# Patient Record
Sex: MALE | Race: WHITE | NOT HISPANIC OR LATINO | Employment: OTHER | URBAN - METROPOLITAN AREA
[De-identification: names, ages, dates, MRNs, and addresses within clinical notes are randomized per-mention and may not be internally consistent; named-entity substitution may affect disease eponyms.]

---

## 2017-01-13 ENCOUNTER — HOSPITAL ENCOUNTER (OUTPATIENT)
Dept: LAB | Facility: HOSPITAL | Age: 67
Discharge: HOME | End: 2017-01-13
Attending: INTERNAL MEDICINE | Admitting: INTERNAL MEDICINE

## 2017-01-13 LAB
INR PPP: 1.4 (ref 2–3)
PROTHROMBIN TIME: 18.3 SEC (ref 19.4–28.5)

## 2017-01-14 ENCOUNTER — HOSPITAL ENCOUNTER (OUTPATIENT)
Dept: LAB | Facility: HOSPITAL | Age: 67
Discharge: HOME | End: 2017-01-14
Attending: INTERNAL MEDICINE | Admitting: INTERNAL MEDICINE

## 2017-01-14 LAB
INR PPP: 1.3 (ref 2–3)
PROTHROMBIN TIME: 17.1 SEC (ref 19.4–28.5)

## 2017-01-15 ENCOUNTER — HOSPITAL ENCOUNTER (OUTPATIENT)
Dept: LAB | Facility: HOSPITAL | Age: 67
Discharge: HOME | End: 2017-01-15
Attending: INTERNAL MEDICINE | Admitting: INTERNAL MEDICINE

## 2017-01-15 LAB
INR PPP: 1.2
PROTHROMBIN TIME: 16.3 SEC (ref 9.6–11.7)

## 2017-01-16 ENCOUNTER — HOSPITAL ENCOUNTER (OUTPATIENT)
Dept: LAB | Facility: HOSPITAL | Age: 67
Setting detail: SPECIMEN
Discharge: HOME | End: 2017-01-16
Attending: INTERNAL MEDICINE | Admitting: INTERNAL MEDICINE

## 2017-01-16 LAB
INR PPP: 1.1 (ref 2–3)
PROTHROMBIN TIME: 14.6 SEC (ref 19.4–28.5)

## 2017-01-17 ENCOUNTER — HOSPITAL ENCOUNTER (OUTPATIENT)
Dept: LAB | Facility: HOSPITAL | Age: 67
Setting detail: SPECIMEN
Discharge: HOME | End: 2017-01-17
Attending: INTERNAL MEDICINE | Admitting: INTERNAL MEDICINE

## 2017-01-17 LAB
INR PPP: 1.2 (ref 2–3)
PROTHROMBIN TIME: 15.5 SEC (ref 19.4–28.5)

## 2017-01-18 ENCOUNTER — HOSPITAL ENCOUNTER (OUTPATIENT)
Dept: LAB | Facility: HOSPITAL | Age: 67
Setting detail: SPECIMEN
Discharge: HOME | End: 2017-01-18
Attending: INTERNAL MEDICINE | Admitting: INTERNAL MEDICINE

## 2017-01-18 LAB
INR PPP: 1.5 (ref 2–3)
PROTHROMBIN TIME: 19.6 SEC (ref 19.4–28.5)

## 2019-09-06 ENCOUNTER — TELEPHONE (OUTPATIENT)
Dept: CARDIOLOGY | Facility: CLINIC | Age: 69
End: 2019-09-06

## 2019-09-06 RX ORDER — LISINOPRIL 2.5 MG/1
2.5 TABLET ORAL DAILY
Qty: 30 TABLET | Refills: 0 | Status: SHIPPED | OUTPATIENT
Start: 2019-09-06 | End: 2019-09-30 | Stop reason: SDUPTHER

## 2019-09-29 PROBLEM — I48.0 PAROXYSMAL ATRIAL FIBRILLATION (HCC): Status: ACTIVE | Noted: 2019-09-29

## 2019-09-29 PROBLEM — Z86.79 HISTORY OF ATRIAL FLUTTER: Status: ACTIVE | Noted: 2019-09-29

## 2019-09-29 PROBLEM — Z98.890 HISTORY OF REPAIR OF MITRAL VALVE: Status: ACTIVE | Noted: 2019-09-29

## 2019-09-29 PROBLEM — Z98.890 HISTORY OF TRICUSPID VALVE REPAIR: Status: ACTIVE | Noted: 2019-09-29

## 2019-09-29 PROBLEM — Z95.1 S/P CABG (CORONARY ARTERY BYPASS GRAFT): Status: ACTIVE | Noted: 2019-09-29

## 2019-09-30 RX ORDER — LISINOPRIL 2.5 MG/1
2.5 TABLET ORAL DAILY
Qty: 30 TABLET | Refills: 0 | Status: SHIPPED | OUTPATIENT
Start: 2019-09-30 | End: 2019-10-31 | Stop reason: SDUPTHER

## 2019-10-19 PROBLEM — I51.9 LV DYSFUNCTION: Status: ACTIVE | Noted: 2019-10-19

## 2019-10-21 ENCOUNTER — OFFICE VISIT (OUTPATIENT)
Dept: CARDIOLOGY | Facility: CLINIC | Age: 69
End: 2019-10-21

## 2019-10-21 VITALS
BODY MASS INDEX: 25.8 KG/M2 | DIASTOLIC BLOOD PRESSURE: 80 MMHG | WEIGHT: 201 LBS | SYSTOLIC BLOOD PRESSURE: 118 MMHG | HEIGHT: 74 IN | HEART RATE: 81 BPM

## 2019-10-21 DIAGNOSIS — Z98.890 HISTORY OF REPAIR OF MITRAL VALVE: ICD-10-CM

## 2019-10-21 DIAGNOSIS — R53.83 OTHER FATIGUE: ICD-10-CM

## 2019-10-21 DIAGNOSIS — E78.2 MIXED HYPERLIPIDEMIA: ICD-10-CM

## 2019-10-21 DIAGNOSIS — I48.21 PERMANENT ATRIAL FIBRILLATION (HCC): Primary | ICD-10-CM

## 2019-10-21 DIAGNOSIS — Z98.890 HISTORY OF TRICUSPID VALVE REPAIR: ICD-10-CM

## 2019-10-21 DIAGNOSIS — I25.10 CORONARY ARTERY DISEASE INVOLVING NATIVE CORONARY ARTERY OF NATIVE HEART WITHOUT ANGINA PECTORIS: ICD-10-CM

## 2019-10-21 DIAGNOSIS — R06.02 SHORTNESS OF BREATH: ICD-10-CM

## 2019-10-21 DIAGNOSIS — I51.9 LV DYSFUNCTION: ICD-10-CM

## 2019-10-21 DIAGNOSIS — R91.8 LUNG NODULES: ICD-10-CM

## 2019-10-21 DIAGNOSIS — I10 ESSENTIAL HYPERTENSION: ICD-10-CM

## 2019-10-21 PROBLEM — J43.8 OTHER EMPHYSEMA (HCC): Status: ACTIVE | Noted: 2019-10-21

## 2019-10-21 PROCEDURE — 93000 ELECTROCARDIOGRAM COMPLETE: CPT | Performed by: INTERNAL MEDICINE

## 2019-10-21 PROCEDURE — 99214 OFFICE O/P EST MOD 30 MIN: CPT | Performed by: INTERNAL MEDICINE

## 2019-10-21 RX ORDER — POTASSIUM CHLORIDE 750 MG/1
10 TABLET, EXTENDED RELEASE ORAL DAILY
Status: SHIPPED | COMMUNITY
Start: 2019-10-21

## 2019-10-21 NOTE — PROGRESS NOTES
Subjective:     Encounter Date:10/21/2019      Patient ID: Kaylene Blake is a 69 y.o. male.    Chief Complaint:  Chief Complaint   Patient presents with   • Atrial Fibrillation   • Congestive Heart Failure       HPI:  History of Present Illness  I had the pleasure of seeing Mr. Blake in the office today.  He is a 69-year-old gentleman with known coronary disease, mitral, diastolic dysfunction, left ventricular dysfunction, permanent atrial fibrillation, emphysema and lung nodules.  Presents to the office today with new complaints of increasing fatigue and ongoing dyspnea.     underwent catheterization in December 2018. His left main was normal.  The LAD was occluded.  Circumflex had 90% stenosis.  OM 2 was occluded.  The RCA had 80% stenosis.  The LIMA to the LAD was patent.  The SVG to mid marginal was patent.  SVG RCA was patent.  His wedge pressure was 18 PA pressure was 44/22.  He had an echocardiogram in October 2018 which demonstrated an ejection fraction 45 to 50% with dilated right ventricle, aortic sclerosis, mild mitral and tricuspid insufficiency.    Mr. Blake states that he is fatigued when he begins to exert himself.  He then becomes short of air and has to stop activity.  He states he rests for a few minutes and then he feels okay.  He does not have chest discomfort.  He does not notice any significant lower extremity edema.  He denies orthopnea or paroxysmal nocturnal dyspnea.  He does not notice palpitations or rapid heart rate.  He had a sleep study within the last year which was negative.  He did have a CT scan in October 2018 which was negative for pulmonary embolus.  Emphysema was noted.  Small noncalcified pulmonary nodules in the lung apices were noted and it was recommended that he have a precautionary 12-month follow-up CT.  This is not been performed as yet.    The following portions of the patient's history were reviewed and updated as appropriate: allergies, current  medications, past family history, past medical history, past social history, past surgical history and problem list.    Problem List:  Patient Active Problem List   Diagnosis   • Hypertension   • Hyperlipidemia   • Coronary artery disease   • Paroxysmal atrial fibrillation (CMS/[facility])   • History of repair of mitral valve   • S/P CABG (coronary artery bypass graft)   • History of tricuspid valve repair   • History of atrial flutter   • LV dysfunction   • Permanent atrial fibrillation   • Other emphysema (CMS/[facility])   • Lung nodules       Past Medical History:  Past Medical History:   Diagnosis Date   • Atrial fibrillation (CMS/[facility])    • CHF (congestive heart failure) (CMS/[facility])    • Coronary artery disease     1-10-15 CABG: LIMA to LAD; saphenous vein graft to mid marginal; saphenous vein graft to RCA   • Coronary artery disease     December 2018: Left main normal. LAD with ostial 100% occlusion. Circumflex with 90% ostial stenosis. OM 2 totally occluded. RCA was dominant with a distal long 80% stenosis.   • Hyperlipidemia    • Hypertension    • Valvular disease     h/o of Mitral valve repair and Tricuspid valve repair        Past Surgical History:  Past Surgical History:   Procedure Laterality Date   • CARDIAC CATHETERIZATION      December 2018: Left main normal. LAD with ostial 100% occlusion. Circumflex with 90% ostial stenosis. OM 2 totally occluded. RCA was dominant with a distal long 80% stenosis.   • CORONARY ARTERY BYPASS GRAFT      LIMA to LAD; saphenous vein graft to mid marginal; saphenous vein graft to RCA   • MITRAL VALVE REPAIR/REPLACEMENT      January 2015: Mitral valve repair with #28 mm [employer] ring.    • TRICUSPID VALVE SURGERY      January 2015: Tricuspid valve repair using a remodeling annuloplasty #30 mm [employer] ring.       Social History:  Social History     Socioeconomic History   • Marital status:      Spouse name: Not on file   • Number of children: Not on file    • Years of education: Not on file   • Highest education level: Not on file   Tobacco Use   • Smoking status: Former Smoker   Substance and Sexual Activity   • Alcohol use: Yes   • Drug use: No   • Sexual activity: Defer       Allergies:  No Known Allergies    Immunizations:    There is no immunization history on file for this patient.    ROS:  Review of Systems   Constitution: Positive for malaise/fatigue. Negative for chills, decreased appetite, fever, weight gain and weight loss.   HENT: Negative for congestion, hoarse voice, nosebleeds and sore throat.    Eyes: Negative for blurred vision, double vision and visual disturbance.   Cardiovascular: Positive for dyspnea on exertion. Negative for chest pain, claudication, irregular heartbeat, leg swelling, near-syncope, orthopnea, palpitations, paroxysmal nocturnal dyspnea and syncope.   Respiratory: Positive for shortness of breath. Negative for cough, hemoptysis, sleep disturbances due to breathing, snoring, sputum production and wheezing.    Endocrine: Negative for cold intolerance, heat intolerance, polydipsia and polyuria.   Hematologic/Lymphatic: Negative for adenopathy and bleeding problem. Does not bruise/bleed easily.   Skin: Negative for flushing, itching, nail changes and rash.   Musculoskeletal: Positive for arthritis. Negative for back pain, joint pain, muscle cramps, muscle weakness, myalgias and neck pain.   Gastrointestinal: Negative for bloating, abdominal pain, anorexia, change in bowel habit, constipation, diarrhea, heartburn, hematemesis, hematochezia, jaundice, melena, nausea and vomiting.   Genitourinary: Negative for dysuria, hematuria and nocturia.   Neurological: Negative for brief paralysis, disturbances in coordination, excessive daytime sleepiness, dizziness, headaches, light-headedness, loss of balance, numbness, paresthesias, seizures and vertigo.   Psychiatric/Behavioral: Negative for altered mental status and depression. The patient is  "not nervous/anxious.    Allergic/Immunologic: Negative for environmental allergies and hives.          Objective:         /80   Pulse 81   Ht 188 cm (74\")   Wt 91.2 kg (201 lb)   BMI 25.81 kg/m²     Physical Exam   Constitutional: He is oriented to person, place, and time. He appears well-developed and well-nourished. No distress.   HENT:   Head: Normocephalic and atraumatic.   Mouth/Throat: Oropharynx is clear and moist.   Eyes: Conjunctivae and EOM are normal. Pupils are equal, round, and reactive to light. No scleral icterus.   Neck: Normal range of motion. Neck supple. No thyromegaly present.   Cardiovascular: Normal rate, S1 normal, S2 normal and intact distal pulses. An irregularly irregular rhythm present.  No extrasystoles are present. PMI is not displaced. Exam reveals no gallop, no S3, no S4, no friction rub and no decreased pulses.   Murmur ( There is a 1/6 to 2/6 systolic murmur heard at the left lower sternal border) heard.  Pulses:       Carotid pulses are 2+ on the right side, and 2+ on the left side.       Dorsalis pedis pulses are 2+ on the right side, and 2+ on the left side.        Posterior tibial pulses are 2+ on the right side, and 2+ on the left side.   Pulmonary/Chest: Effort normal and breath sounds normal. No respiratory distress. He has no wheezes. He has no rales.   Abdominal: Soft. Bowel sounds are normal. He exhibits no distension and no mass. There is no tenderness. There is no rebound and no guarding.   Musculoskeletal: Normal range of motion. Edema:  There is trace bilateral lower extremity edema.   Lymphadenopathy:     He has no cervical adenopathy.   Neurological: He is alert and oriented to person, place, and time. Coordination normal.   Skin: Skin is warm and dry. No rash noted. He is not diaphoretic. No pallor.   Psychiatric: He has a normal mood and affect. His behavior is normal.   Nursing note and vitals reviewed.      In-Office Procedure(s):    ECG 12 " Lead  Date/Time: 10/21/2019 10:43 AM  Performed by: Kerry Sommers MD  Authorized by: Kerry Sommers MD   Comparison: compared with previous ECG from 9/21/2018  Similar to previous ECG  Comments: Atrial fibrillation, rate 70.  Nonspecific interventricular conduction delay.  Poor anterior R wave progression.  Lateral T wave changes noted.            ASCVD RIsk Score::  The ASCVD Risk score ([place] YVONNE Jr., et al., 2013) failed to calculate for the following reasons:    Cannot find a previous HDL lab    Cannot find a previous total cholesterol lab    Recent Radiology:  Imaging Results (most recent)     None          Lab Review:   not applicable             Assessment:          Diagnosis Plan   1. Shortness of breath  Adult Transthoracic Echo Complete W/ Cont if Necessary Per Protocol   2. Other fatigue     3. Permanent atrial fibrillation  Holter Monitor - 72 Hour Up To 21 Days   4. Lung nodules  CT Chest Without Contrast   5. History of repair of mitral valve     6. History of tricuspid valve repair     7. Coronary artery disease involving native coronary artery of native heart without angina pectoris     8. Mixed hyperlipidemia     9. Essential hypertension     10. LV dysfunction            Plan:      1.  Shortness of air  Mr. Blake is complaining of shortness of air.  I am going to order an echocardiogram to make sure that his left ventricular ejection fraction has remained stable.  He did have a dilated right ventricle which is most likely a reflection of his underlying pulmonary disease.  If his RVSP remains elevated, will refer him to pulmonary.    2.  Fatigue  This is most likely multifactorial.  I am going to place a 72-hour monitor to see if his atrial fibrillation rate is controlled    3.  Permanent atrial fibrillation  Mr. Blake has been in atrial fibrillation for quite some time.  He was cardioverted in 2017 but returned to atrial fibrillation.  He is now on anticoagulation with rate control.    4.   Lung nodules  Lung nodules were noted on the previous CT scan in October 2018.  A follow-up CT scan was recommended.  I will order this    5.  Mitral and tricuspid valve repair  Stable.  Previous echo October 2018 demonstrated mild MR and TR    6.  CAD  Prior CABG.  Catheterization in December 2018 demonstrated patent grafts.  His EKG in the office today demonstrated atrial fibrillation with lateral T wave changes.  This is unchanged from previous tracing    7.  Dyslipidemia  Mr. Blake states that he had lab work obtained approximately 1 week ago.  I will get a copy    8.  Essential hypertension  Blood pressure is currently well controlled    9.  LV dysfunction  His ejection fraction is 45 to 50% per echo in October 2018        Level of Care:                 Kerry Sommers MD  10/21/19  .

## 2019-10-23 ENCOUNTER — TELEPHONE (OUTPATIENT)
Dept: CARDIOLOGY | Facility: CLINIC | Age: 69
End: 2019-10-23

## 2019-10-23 NOTE — TELEPHONE ENCOUNTER
Mr Blake is going to [facility]. He is needing a prescription that states he needs Eliquis (not the generic), [facility]. Requesting a call back. Thank you      [phone]

## 2019-10-31 RX ORDER — LISINOPRIL 2.5 MG/1
2.5 TABLET ORAL DAILY
Qty: 30 TABLET | Refills: 5 | Status: SHIPPED | OUTPATIENT
Start: 2019-10-31 | End: 2020-01-02 | Stop reason: SDUPTHER

## 2019-12-26 ENCOUNTER — TELEPHONE (OUTPATIENT)
Dept: CARDIOLOGY | Facility: CLINIC | Age: 69
End: 2019-12-26

## 2019-12-28 ENCOUNTER — HOSPITAL ENCOUNTER (EMERGENCY)
Facility: HOSPITAL | Age: 69
Discharge: HOME | End: 2019-12-28
Attending: EMERGENCY MEDICINE | Admitting: EMERGENCY MEDICINE

## 2019-12-28 ENCOUNTER — APPOINTMENT (OUTPATIENT)
Dept: MRI IMAGING | Facility: HOSPITAL | Age: 69
End: 2019-12-28

## 2019-12-28 ENCOUNTER — APPOINTMENT (OUTPATIENT)
Dept: GENERAL RADIOLOGY | Facility: HOSPITAL | Age: 69
End: 2019-12-28

## 2019-12-28 VITALS
WEIGHT: 199.3 LBS | SYSTOLIC BLOOD PRESSURE: 134 MMHG | BODY MASS INDEX: 25.58 KG/M2 | TEMPERATURE: 97.8 F | RESPIRATION RATE: 16 BRPM | OXYGEN SATURATION: 95 % | HEART RATE: 75 BPM | HEIGHT: 74 IN | DIASTOLIC BLOOD PRESSURE: 65 MMHG

## 2019-12-28 DIAGNOSIS — R20.2 PARESTHESIAS: Primary | ICD-10-CM

## 2019-12-28 DIAGNOSIS — R60.0 LEG EDEMA: ICD-10-CM

## 2019-12-28 LAB
ALBUMIN SERPL-MCNC: 4.4 G/DL (ref 3.5–5.2)
ALBUMIN/GLOB SERPL: 1.2 G/DL
ALP SERPL-CCNC: 96 U/L (ref 39–117)
ALT SERPL W P-5'-P-CCNC: 21 U/L (ref 1–41)
ANION GAP SERPL CALCULATED.3IONS-SCNC: 13 MMOL/L (ref 5–15)
AST SERPL-CCNC: 31 U/L (ref 1–40)
BASOPHILS # BLD AUTO: 0 10*3/MM3 (ref 0–0.2)
BASOPHILS NFR BLD AUTO: 0 % (ref 0–1.5)
BILIRUB SERPL-MCNC: 1.3 MG/DL (ref 0.2–1.2)
BUN BLD-MCNC: 15 MG/DL (ref 8–23)
BUN/CREAT SERPL: 17.9 (ref 7–25)
CALCIUM SPEC-SCNC: 9.3 MG/DL (ref 8.6–10.5)
CHLORIDE SERPL-SCNC: 99 MMOL/L (ref 98–107)
CO2 SERPL-SCNC: 26 MMOL/L (ref 22–29)
CREAT BLD-MCNC: 0.84 MG/DL (ref 0.76–1.27)
DEPRECATED RDW RBC AUTO: 49.4 FL (ref 37–54)
EOSINOPHIL # BLD AUTO: 0.2 10*3/MM3 (ref 0–0.4)
EOSINOPHIL NFR BLD AUTO: 3 % (ref 0.3–6.2)
ERYTHROCYTE [DISTWIDTH] IN BLOOD BY AUTOMATED COUNT: 15.8 % (ref 12.3–15.4)
GFR SERPL CREATININE-BSD FRML MDRD: 90 ML/MIN/1.73
GLOBULIN UR ELPH-MCNC: 3.6 GM/DL
GLUCOSE BLD-MCNC: 105 MG/DL (ref 65–99)
HCT VFR BLD AUTO: 43.7 % (ref 37.5–51)
HGB BLD-MCNC: 14.6 G/DL (ref 13–17.7)
LYMPHOCYTES # BLD AUTO: 1.1 10*3/MM3 (ref 0.7–3.1)
LYMPHOCYTES NFR BLD AUTO: 20.9 % (ref 19.6–45.3)
MAGNESIUM SERPL-MCNC: 2.3 MG/DL (ref 1.6–2.4)
MCH RBC QN AUTO: 30.1 PG (ref 26.6–33)
MCHC RBC AUTO-ENTMCNC: 33.5 G/DL (ref 31.5–35.7)
MCV RBC AUTO: 90 FL (ref 79–97)
MONOCYTES # BLD AUTO: 0.9 10*3/MM3 (ref 0.1–0.9)
MONOCYTES NFR BLD AUTO: 17.5 % (ref 5–12)
NEUTROPHILS # BLD AUTO: 3.1 10*3/MM3 (ref 1.7–7)
NEUTROPHILS NFR BLD AUTO: 58.6 % (ref 42.7–76)
NRBC BLD AUTO-RTO: 0 /100 WBC (ref 0–0.2)
NT-PROBNP SERPL-MCNC: 778 PG/ML (ref 5–900)
PLATELET # BLD AUTO: 267 10*3/MM3 (ref 140–450)
PMV BLD AUTO: 7.6 FL (ref 6–12)
POTASSIUM BLD-SCNC: 3.6 MMOL/L (ref 3.5–5.2)
PROT SERPL-MCNC: 8 G/DL (ref 6–8.5)
RBC # BLD AUTO: 4.85 10*6/MM3 (ref 4.14–5.8)
SODIUM BLD-SCNC: 138 MMOL/L (ref 136–145)
TROPONIN T SERPL-MCNC: <0.01 NG/ML (ref 0–0.03)
WBC NRBC COR # BLD: 5.4 10*3/MM3 (ref 3.4–10.8)

## 2019-12-28 PROCEDURE — 83735 ASSAY OF MAGNESIUM: CPT | Performed by: EMERGENCY MEDICINE

## 2019-12-28 PROCEDURE — 83880 ASSAY OF NATRIURETIC PEPTIDE: CPT | Performed by: EMERGENCY MEDICINE

## 2019-12-28 PROCEDURE — 70551 MRI BRAIN STEM W/O DYE: CPT

## 2019-12-28 PROCEDURE — 71045 X-RAY EXAM CHEST 1 VIEW: CPT

## 2019-12-28 PROCEDURE — 85025 COMPLETE CBC W/AUTO DIFF WBC: CPT | Performed by: EMERGENCY MEDICINE

## 2019-12-28 PROCEDURE — 84484 ASSAY OF TROPONIN QUANT: CPT | Performed by: EMERGENCY MEDICINE

## 2019-12-28 PROCEDURE — 80053 COMPREHEN METABOLIC PANEL: CPT | Performed by: EMERGENCY MEDICINE

## 2019-12-28 PROCEDURE — 93005 ELECTROCARDIOGRAM TRACING: CPT | Performed by: EMERGENCY MEDICINE

## 2019-12-28 PROCEDURE — 99284 EMERGENCY DEPT VISIT MOD MDM: CPT

## 2019-12-28 RX ORDER — SODIUM CHLORIDE 0.9 % (FLUSH) 0.9 %
10 SYRINGE (ML) INJECTION AS NEEDED
Status: DISCONTINUED | OUTPATIENT
Start: 2019-12-28 | End: 2019-12-28 | Stop reason: HOSPADM

## 2019-12-30 ENCOUNTER — TELEPHONE (OUTPATIENT)
Dept: CARDIOLOGY | Facility: CLINIC | Age: 69
End: 2019-12-30

## 2019-12-30 DIAGNOSIS — R06.02 SHORTNESS OF BREATH: ICD-10-CM

## 2019-12-30 DIAGNOSIS — R60.0 BILATERAL LEG EDEMA: ICD-10-CM

## 2019-12-30 DIAGNOSIS — I48.0 PAROXYSMAL ATRIAL FIBRILLATION (HCC): Primary | ICD-10-CM

## 2019-12-30 NOTE — TELEPHONE ENCOUNTER
Mr Wolf was having swelling in his feet and pain in left arm. He went to the [facility] 12/28/19 and they ran a bunch of test. They recommend he have a echocardiogram done. Patient would like to know if one could be schedule or does he need to come in sooner for a office visit. Thank you    [phone]

## 2019-12-31 NOTE — TELEPHONE ENCOUNTER
Patients wife calling regarding the message below. Wants to see if we can get him in for an appt on Jan 2.

## 2019-12-31 NOTE — TELEPHONE ENCOUNTER
Mr Shelton Called to see if Adrienne had heard anything from Dr Butler. Told patient Adrienne was waiting on a response, would call as soon as she heard something. mg

## 2020-01-02 ENCOUNTER — HOSPITAL ENCOUNTER (OUTPATIENT)
Dept: CARDIOLOGY | Facility: HOSPITAL | Age: 70
Discharge: HOME | End: 2020-01-02
Admitting: INTERNAL MEDICINE

## 2020-01-02 VITALS
SYSTOLIC BLOOD PRESSURE: 115 MMHG | WEIGHT: 190 LBS | BODY MASS INDEX: 24.38 KG/M2 | DIASTOLIC BLOOD PRESSURE: 73 MMHG | HEIGHT: 74 IN

## 2020-01-02 DIAGNOSIS — I51.7 RIGHT VENTRICULAR ENLARGEMENT: Primary | ICD-10-CM

## 2020-01-02 DIAGNOSIS — I48.0 PAROXYSMAL ATRIAL FIBRILLATION (HCC): ICD-10-CM

## 2020-01-02 DIAGNOSIS — R06.02 SHORTNESS OF BREATH: ICD-10-CM

## 2020-01-02 DIAGNOSIS — R60.0 BILATERAL LEG EDEMA: ICD-10-CM

## 2020-01-02 DIAGNOSIS — I51.7 RIGHT VENTRICULAR DILATION: ICD-10-CM

## 2020-01-02 DIAGNOSIS — I50.810 RIGHT-SIDED CONGESTIVE HEART FAILURE, UNSPECIFIED HF CHRONICITY (HCC): Primary | ICD-10-CM

## 2020-01-02 LAB
BH CV ECHO MEAS - ACS: 2.2 CM
BH CV ECHO MEAS - AO MAX PG (FULL): 4.2 MMHG
BH CV ECHO MEAS - AO MAX PG: 6.5 MMHG
BH CV ECHO MEAS - AO MEAN PG (FULL): 2.3 MMHG
BH CV ECHO MEAS - AO MEAN PG: 3.6 MMHG
BH CV ECHO MEAS - AO ROOT AREA (BSA CORRECTED): 1.4
BH CV ECHO MEAS - AO ROOT AREA: 7.2 CM^2
BH CV ECHO MEAS - AO ROOT DIAM: 3 CM
BH CV ECHO MEAS - AO V2 MAX: 127 CM/SEC
BH CV ECHO MEAS - AO V2 MEAN: 90.7 CM/SEC
BH CV ECHO MEAS - AO V2 VTI: 28 CM
BH CV ECHO MEAS - AORTIC HR: 193.8 BPM
BH CV ECHO MEAS - AORTIC R-R: 0.31 SEC
BH CV ECHO MEAS - ASC AORTA: 3.8 CM
BH CV ECHO MEAS - AVA(I,A): 2.3 CM^2
BH CV ECHO MEAS - AVA(I,D): 2.3 CM^2
BH CV ECHO MEAS - AVA(V,A): 2.6 CM^2
BH CV ECHO MEAS - AVA(V,D): 2.6 CM^2
BH CV ECHO MEAS - BSA(HAYCOCK): 2.1 M^2
BH CV ECHO MEAS - BSA: 2.1 M^2
BH CV ECHO MEAS - BZI_BMI: 24.4 KILOGRAMS/M^2
BH CV ECHO MEAS - BZI_METRIC_HEIGHT: 188 CM
BH CV ECHO MEAS - BZI_METRIC_WEIGHT: 86.2 KG
BH CV ECHO MEAS - CI(AO): 18.5 L/MIN/M^2
BH CV ECHO MEAS - CI(LVOT): 6 L/MIN/M^2
BH CV ECHO MEAS - CO(AO): 39.3 L/MIN
BH CV ECHO MEAS - CO(LVOT): 12.7 L/MIN
BH CV ECHO MEAS - EDV(CUBED): 104.8 ML
BH CV ECHO MEAS - EDV(MOD-SP2): 84.3 ML
BH CV ECHO MEAS - EDV(MOD-SP4): 95.5 ML
BH CV ECHO MEAS - EDV(TEICH): 103.1 ML
BH CV ECHO MEAS - EF(CUBED): 54.8 %
BH CV ECHO MEAS - EF(MOD-BP): 39 %
BH CV ECHO MEAS - EF(MOD-SP2): 43.7 %
BH CV ECHO MEAS - EF(MOD-SP4): 22.2 %
BH CV ECHO MEAS - EF(TEICH): 46.6 %
BH CV ECHO MEAS - ESV(CUBED): 47.4 ML
BH CV ECHO MEAS - ESV(MOD-SP2): 47.4 ML
BH CV ECHO MEAS - ESV(MOD-SP4): 74.3 ML
BH CV ECHO MEAS - ESV(TEICH): 55.1 ML
BH CV ECHO MEAS - FS: 23.3 %
BH CV ECHO MEAS - IVS/LVPW: 1.1
BH CV ECHO MEAS - IVSD: 1.1 CM
BH CV ECHO MEAS - LA DIMENSION(2D): 4.6 CM
BH CV ECHO MEAS - LV DIASTOLIC VOL/BSA (35-75): 44.9 ML/M^2
BH CV ECHO MEAS - LV MASS(C)D: 191 GRAMS
BH CV ECHO MEAS - LV MASS(C)DI: 89.8 GRAMS/M^2
BH CV ECHO MEAS - LV MAX PG: 2.2 MMHG
BH CV ECHO MEAS - LV MEAN PG: 1.3 MMHG
BH CV ECHO MEAS - LV SYSTOLIC VOL/BSA (12-30): 34.9 ML/M^2
BH CV ECHO MEAS - LV V1 MAX: 74.9 CM/SEC
BH CV ECHO MEAS - LV V1 MEAN: 53.8 CM/SEC
BH CV ECHO MEAS - LV V1 VTI: 15.2 CM
BH CV ECHO MEAS - LVIDD: 4.7 CM
BH CV ECHO MEAS - LVIDS: 3.6 CM
BH CV ECHO MEAS - LVOT AREA: 4.3 CM^2
BH CV ECHO MEAS - LVOT DIAM: 2.3 CM
BH CV ECHO MEAS - LVPWD: 1.1 CM
BH CV ECHO MEAS - MV DEC TIME: 0.27 SEC
BH CV ECHO MEAS - MV E MAX VEL: 159.4 CM/SEC
BH CV ECHO MEAS - PA ACC TIME: 0.04 SEC
BH CV ECHO MEAS - PA MAX PG: 4.9 MMHG
BH CV ECHO MEAS - PA PR(ACCEL): 60.2 MMHG
BH CV ECHO MEAS - PA V2 MAX: 110.7 CM/SEC
BH CV ECHO MEAS - PI END-D VEL: 206.8 CM/SEC
BH CV ECHO MEAS - PI MAX PG: 36.2 MMHG
BH CV ECHO MEAS - PI MAX VEL: 300.5 CM/SEC
BH CV ECHO MEAS - RAP SYSTOLE: 8 MMHG
BH CV ECHO MEAS - RVDD: 4 CM
BH CV ECHO MEAS - RVOT AREA: 7 CM^2
BH CV ECHO MEAS - RVOT DIAM: 3 CM
BH CV ECHO MEAS - RVSP: 50.8 MMHG
BH CV ECHO MEAS - SI(AO): 95.2 ML/M^2
BH CV ECHO MEAS - SI(CUBED): 27 ML/M^2
BH CV ECHO MEAS - SI(LVOT): 30.8 ML/M^2
BH CV ECHO MEAS - SI(MOD-SP2): 17.3 ML/M^2
BH CV ECHO MEAS - SI(MOD-SP4): 10 ML/M^2
BH CV ECHO MEAS - SI(TEICH): 22.6 ML/M^2
BH CV ECHO MEAS - SV(AO): 202.5 ML
BH CV ECHO MEAS - SV(CUBED): 57.4 ML
BH CV ECHO MEAS - SV(LVOT): 65.6 ML
BH CV ECHO MEAS - SV(MOD-SP2): 36.8 ML
BH CV ECHO MEAS - SV(MOD-SP4): 21.2 ML
BH CV ECHO MEAS - SV(TEICH): 48 ML
BH CV ECHO MEAS - TR MAX VEL: 326 CM/SEC

## 2020-01-02 PROCEDURE — 93306 TTE W/DOPPLER COMPLETE: CPT

## 2020-01-02 PROCEDURE — 93306 TTE W/DOPPLER COMPLETE: CPT | Performed by: INTERNAL MEDICINE

## 2020-01-02 RX ORDER — LISINOPRIL 5 MG/1
5 TABLET ORAL DAILY
Qty: 90 TABLET | Refills: 1 | Status: SHIPPED | OUTPATIENT
Start: 2020-01-02

## 2020-01-07 ENCOUNTER — HOSPITAL ENCOUNTER (OUTPATIENT)
Dept: CT IMAGING | Facility: HOSPITAL | Age: 70
Discharge: HOME | End: 2020-01-07
Admitting: INTERNAL MEDICINE

## 2020-01-07 DIAGNOSIS — I51.7 RIGHT VENTRICULAR DILATION: ICD-10-CM

## 2020-01-07 DIAGNOSIS — R06.02 SHORTNESS OF BREATH: ICD-10-CM

## 2020-01-07 DIAGNOSIS — I50.810 RIGHT-SIDED CONGESTIVE HEART FAILURE, UNSPECIFIED HF CHRONICITY (HCC): ICD-10-CM

## 2020-01-07 PROCEDURE — 0 IOPAMIDOL PER 1 ML: Performed by: INTERNAL MEDICINE

## 2020-01-07 PROCEDURE — 71275 CT ANGIOGRAPHY CHEST: CPT

## 2020-01-07 RX ADMIN — IOPAMIDOL 100 ML: 755 INJECTION, SOLUTION INTRAVENOUS at 13:48

## 2020-01-09 ENCOUNTER — TELEPHONE (OUTPATIENT)
Dept: CARDIOLOGY | Facility: CLINIC | Age: 70
End: 2020-01-09

## 2020-01-22 ENCOUNTER — TELEPHONE (OUTPATIENT)
Dept: CARDIOLOGY | Facility: CLINIC | Age: 70
End: 2020-01-22

## 2020-01-22 ENCOUNTER — OFFICE VISIT (OUTPATIENT)
Dept: CARDIOLOGY | Facility: CLINIC | Age: 70
End: 2020-01-22

## 2020-01-22 VITALS
WEIGHT: 209 LBS | BODY MASS INDEX: 26.82 KG/M2 | HEART RATE: 78 BPM | SYSTOLIC BLOOD PRESSURE: 102 MMHG | HEIGHT: 74 IN | DIASTOLIC BLOOD PRESSURE: 72 MMHG

## 2020-01-22 DIAGNOSIS — I48.21 PERMANENT ATRIAL FIBRILLATION (HCC): ICD-10-CM

## 2020-01-22 DIAGNOSIS — I10 ESSENTIAL HYPERTENSION: ICD-10-CM

## 2020-01-22 DIAGNOSIS — Z98.890 HISTORY OF REPAIR OF MITRAL VALVE: ICD-10-CM

## 2020-01-22 DIAGNOSIS — Z98.890 HISTORY OF TRICUSPID VALVE REPAIR: ICD-10-CM

## 2020-01-22 DIAGNOSIS — R06.09 DYSPNEA ON EXERTION: ICD-10-CM

## 2020-01-22 DIAGNOSIS — I51.9 LV DYSFUNCTION: ICD-10-CM

## 2020-01-22 DIAGNOSIS — I25.10 CORONARY ARTERY DISEASE INVOLVING NATIVE CORONARY ARTERY OF NATIVE HEART WITHOUT ANGINA PECTORIS: Primary | ICD-10-CM

## 2020-01-22 PROCEDURE — 99213 OFFICE O/P EST LOW 20 MIN: CPT | Performed by: INTERNAL MEDICINE

## 2020-01-22 NOTE — PROGRESS NOTES
Subjective:     Encounter Date:01/22/2020      Patient ID: Wolf Blake is a 69 y.o. male.    Chief Complaint:  Chief Complaint   Patient presents with   • Atrial Fibrillation       HPI:  History of Present Illness  I had the pleasure of seeing Mr. Blake in the office today.  He is a 69-year-old gentleman with known coronary disease, mitral valve repair, diastolic dysfunction, left ventricular dysfunction with most recent ejection fraction of 35 to 40%.  He has permanent atrial fibrillation, underlying emphysema and lung nodules.    He recently was complaining of fatigue and dyspnea.  He also has noted lower extremity edema.  He underwent cardiac catheterization in December 2018.  He had severe native vessel disease.  The LIMA to the LAD was patent.  Saphenous vein graft to the marginal was patent.  Saphenous vein graft to RCA was patent.  His pulmonary wedge pressure was 18 mmHg and his PA pressure was 44 /22.  In October 2018 his ejection fraction was 45 to 50% with a dilated right ventricle.  In January 2020, his ejection fraction had decreased to 35 to 40%.    I did order a CT angiogram of the chest which was performed on 1/17/2020.  There was no evidence of aortic aneurysm, hematoma or dissection.  There was enlargement of the main pulmonary artery suggesting pulmonary arterial hypertension.  There was no evidence of pulmonary embolism.  Emphysema was noted.  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  He has had a sleep study within the last 1 to 2 years which was negative.    Mr. Blake is in the office today.  He continues to have dyspnea on exertion and lower extremity edema.  He does not describe orthopnea or paroxysmal nocturnal dyspnea.  He states that he does not notice any rapid or slow heart rate with his atrial fibrillation.  He is on anticoagulation.    The following portions of the patient's history were reviewed and updated as appropriate:  allergies, current medications, past family history, past medical history, past social history, past surgical history and problem list.    Problem List:  Patient Active Problem List   Diagnosis   • Hypertension   • Hyperlipidemia   • Coronary artery disease   • History of repair of mitral valve   • S/P CABG (coronary artery bypass graft)   • History of tricuspid valve repair   • History of atrial flutter   • LV dysfunction   • Permanent atrial fibrillation   • Other emphysema (CMS/[facility])   • Lung nodules   • Dyspnea on exertion       Past Medical History:  Past Medical History:   Diagnosis Date   • Atrial fibrillation (CMS/[facility])    • CHF (congestive heart failure) (CMS/[facility])    • Coronary artery disease     1-10-15 CABG: LIMA to LAD; saphenous vein graft to mid marginal; saphenous vein graft to RCA   • Coronary artery disease     December 2018: Left main normal. LAD with ostial 100% occlusion. Circumflex with 90% ostial stenosis. OM 2 totally occluded. RCA was dominant with a distal long 80% stenosis.   • Hyperlipidemia    • Hypertension    • Valvular disease     h/o of Mitral valve repair and Tricuspid valve repair        Past Surgical History:  Past Surgical History:   Procedure Laterality Date   • CARDIAC CATHETERIZATION      December 2018: Left main normal. LAD with ostial 100% occlusion. Circumflex with 90% ostial stenosis. OM 2 totally occluded. RCA was dominant with a distal long 80% stenosis.   • CORONARY ARTERY BYPASS GRAFT      LIMA to LAD; saphenous vein graft to mid marginal; saphenous vein graft to RCA   • MITRAL VALVE REPAIR/REPLACEMENT      January 2015: Mitral valve repair with #28 mm [employer] ring.    • TRICUSPID VALVE SURGERY      January 2015: Tricuspid valve repair using a remodeling annuloplasty #30 mm [employer] ring.       Social History:  Social History     Socioeconomic History   • Marital status:      Spouse name: Not on file   • Number of children: Not on file    • Years of education: Not on file   • Highest education level: Not on file   Tobacco Use   • Smoking status: Former Smoker   Substance and Sexual Activity   • Alcohol use: Yes   • Drug use: No   • Sexual activity: Defer       Allergies:  No Known Allergies    Immunizations:    There is no immunization history on file for this patient.    ROS:  Review of Systems   Constitution: Negative for chills, decreased appetite, fever, malaise/fatigue, weight gain and weight loss.   HENT: Negative for congestion, hoarse voice, nosebleeds and sore throat.    Eyes: Negative for blurred vision, double vision and visual disturbance.   Cardiovascular: Positive for dyspnea on exertion and leg swelling. Negative for chest pain, claudication, irregular heartbeat, near-syncope, orthopnea, palpitations, paroxysmal nocturnal dyspnea and syncope.   Respiratory: Negative for cough, hemoptysis, shortness of breath, sleep disturbances due to breathing, snoring, sputum production and wheezing.    Endocrine: Negative for cold intolerance, heat intolerance, polydipsia and polyuria.   Hematologic/Lymphatic: Negative for adenopathy and bleeding problem. Does not bruise/bleed easily.   Skin: Negative for flushing, itching, nail changes and rash.   Musculoskeletal: Negative for arthritis, back pain, joint pain, muscle cramps, muscle weakness, myalgias and neck pain.   Gastrointestinal: Negative for bloating, abdominal pain, anorexia, change in bowel habit, constipation, diarrhea, heartburn, hematemesis, hematochezia, jaundice, melena, nausea and vomiting.   Genitourinary: Negative for dysuria, hematuria and nocturia.   Neurological: Negative for brief paralysis, disturbances in coordination, excessive daytime sleepiness, dizziness, headaches, light-headedness, loss of balance, numbness, paresthesias, seizures and vertigo.   Psychiatric/Behavioral: Negative for altered mental status and depression. The patient is not nervous/anxious.   "  Allergic/Immunologic: Negative for environmental allergies and hives.          Objective:         /72   Pulse 78   Ht 188 cm (74\")   Wt 94.8 kg (209 lb)   BMI 26.83 kg/m²     Physical Exam   Constitutional: He is oriented to person, place, and time. He appears well-developed and well-nourished. No distress.   HENT:   Head: Normocephalic and atraumatic.   Mouth/Throat: Oropharynx is clear and moist.   Eyes: Pupils are equal, round, and reactive to light. Conjunctivae and EOM are normal. No scleral icterus.   Neck: Normal range of motion. Neck supple. No thyromegaly present.   Cardiovascular: Normal rate, regular rhythm, S1 normal, S2 normal and intact distal pulses.  No extrasystoles are present. PMI is not displaced. Exam reveals no gallop, no S3, no S4, no friction rub and no decreased pulses.   Murmur ( There is a 1/6 systolic murmur heard at the left lower sternal border) heard.  Pulses:       Carotid pulses are 2+ on the right side, and 2+ on the left side.       Radial pulses are 2+ on the right side, and 2+ on the left side.        Dorsalis pedis pulses are 2+ on the right side, and 2+ on the left side.        Posterior tibial pulses are 2+ on the right side, and 2+ on the left side.   Pulmonary/Chest: Effort normal and breath sounds normal. No respiratory distress. He has no wheezes. He has no rales.   Abdominal: Soft. Bowel sounds are normal. He exhibits no distension and no mass. There is no tenderness. There is no rebound and no guarding.   Musculoskeletal: Normal range of motion. He exhibits edema ( There is 2+ pitting edema of the lower extremities bilaterally.).   Lymphadenopathy:     He has no cervical adenopathy.   Neurological: He is alert and oriented to person, place, and time. Coordination normal.   Skin: Skin is warm and dry. No rash noted. He is not diaphoretic. No pallor.   Psychiatric: He has a normal mood and affect. His behavior is normal.   Nursing note and vitals " reviewed.      In-Office Procedure(s):  Procedures    ASCVD RIsk Score::  The ASCVD Risk score ([place] YVONNE Jr., et al., 2013) failed to calculate for the following reasons:    Cannot find a previous HDL lab    Cannot find a previous total cholesterol lab    Recent Radiology:  Imaging Results (Most Recent)     None          Lab Review:   not applicable             Assessment:          Diagnosis Plan   1. Coronary artery disease involving native coronary artery of native heart without angina pectoris     2. Essential hypertension     3. Permanent atrial fibrillation     4. LV dysfunction     5. History of repair of mitral valve     6. History of tricuspid valve repair     7. Dyspnea on exertion            Plan:      Mr. Blake continues to complain of lower extremity edema and dyspnea.  His cardiac catheterization from December 2018 revealed patent grafts.  I am concerned about his decrease in left ventricular function and his right ventricular dilatation.  He has an appointment to see pulmonary in a couple of weeks.    I have increased his Lasix to 40 mg twice daily.    I did discuss his atrial fibrillation.  He is fairly asymptomatic.  He has had cardioversion on a couple of occasions but eventually returned to atrial fibrillation.  I discussed the possibility of atrial fibrillation ablation, as this might improve his cardiac output.  He is not interested in pursuing this avenue presently since he is relatively asymptomatic.    I will see him in the office after he has his visit with Dr. Bazan.      Level of Care:                 Kerry Sommers MD  01/22/20  .

## 2020-01-28 ENCOUNTER — TRANSCRIBE ORDERS (OUTPATIENT)
Dept: ADMINISTRATIVE | Facility: HOSPITAL | Age: 70
End: 2020-01-28

## 2020-01-28 DIAGNOSIS — I27.20 PULMONARY HTN (HCC): Primary | ICD-10-CM

## 2020-01-29 ENCOUNTER — HOSPITAL ENCOUNTER (OUTPATIENT)
Dept: CARDIOLOGY | Facility: HOSPITAL | Age: 70
Discharge: HOME | End: 2020-01-29

## 2020-01-29 ENCOUNTER — HOSPITAL ENCOUNTER (OUTPATIENT)
Dept: NUCLEAR MEDICINE | Facility: HOSPITAL | Age: 70
Discharge: HOME | End: 2020-01-29

## 2020-01-29 ENCOUNTER — HOSPITAL ENCOUNTER (OUTPATIENT)
Dept: GENERAL RADIOLOGY | Facility: HOSPITAL | Age: 70
Discharge: HOME | End: 2020-01-29
Admitting: INTERNAL MEDICINE

## 2020-01-29 DIAGNOSIS — I27.20 PULMONARY HTN (HCC): ICD-10-CM

## 2020-01-29 LAB

## 2020-01-29 PROCEDURE — A9540 TC99M MAA: HCPCS | Performed by: INTERNAL MEDICINE

## 2020-01-29 PROCEDURE — 78582 LUNG VENTILAT&PERFUS IMAGING: CPT

## 2020-01-29 PROCEDURE — A9538 TC99M PYROPHOSPHATE: HCPCS | Performed by: INTERNAL MEDICINE

## 2020-01-29 PROCEDURE — 71046 X-RAY EXAM CHEST 2 VIEWS: CPT

## 2020-01-29 PROCEDURE — 93970 EXTREMITY STUDY: CPT

## 2020-01-29 PROCEDURE — 0 TECHNETIUM ALBUMIN AGGREGATED: Performed by: INTERNAL MEDICINE

## 2020-01-29 PROCEDURE — 0 TECHNETIUM TC99M PYROPHOSPHATE: Performed by: INTERNAL MEDICINE

## 2020-01-29 RX ADMIN — Medication 1 DOSE: at 14:00

## 2020-01-29 RX ADMIN — TECHNETIUM TC99M PYROPHOSPHATE 1 DOSE: 12 INJECTION INTRAVENOUS at 13:30

## 2020-01-30 ENCOUNTER — TRANSCRIBE ORDERS (OUTPATIENT)
Dept: SLEEP MEDICINE | Facility: HOSPITAL | Age: 70
End: 2020-01-30

## 2020-01-30 DIAGNOSIS — R06.83 SNORING: ICD-10-CM

## 2020-01-30 DIAGNOSIS — G47.30 SLEEP APNEA, UNSPECIFIED TYPE: Primary | ICD-10-CM

## 2020-02-03 ENCOUNTER — HOSPITAL ENCOUNTER (OUTPATIENT)
Dept: SLEEP MEDICINE | Facility: HOSPITAL | Age: 70
Discharge: HOME | End: 2020-02-03
Admitting: INTERNAL MEDICINE

## 2020-02-03 DIAGNOSIS — R06.83 SNORING: ICD-10-CM

## 2020-02-03 DIAGNOSIS — G47.30 SLEEP APNEA, UNSPECIFIED TYPE: ICD-10-CM

## 2020-02-03 PROCEDURE — 95811 POLYSOM 6/>YRS CPAP 4/> PARM: CPT

## 2020-02-09 DIAGNOSIS — G47.33 OSA (OBSTRUCTIVE SLEEP APNEA): Primary | ICD-10-CM

## 2020-02-13 ENCOUNTER — HOSPITAL ENCOUNTER (OUTPATIENT)
Dept: SLEEP MEDICINE | Facility: HOSPITAL | Age: 70
Discharge: HOME | End: 2020-02-13
Admitting: INTERNAL MEDICINE

## 2020-02-13 VITALS — BODY MASS INDEX: 26.82 KG/M2 | HEIGHT: 74 IN | WEIGHT: 209 LBS

## 2020-02-13 PROCEDURE — 95811 POLYSOM 6/>YRS CPAP 4/> PARM: CPT

## 2020-02-19 DIAGNOSIS — G47.33 OSA (OBSTRUCTIVE SLEEP APNEA): Primary | ICD-10-CM

## 2020-03-03 ENCOUNTER — OFFICE VISIT (OUTPATIENT)
Dept: CARDIOLOGY | Facility: CLINIC | Age: 70
End: 2020-03-03

## 2020-03-03 VITALS
WEIGHT: 206 LBS | HEART RATE: 84 BPM | DIASTOLIC BLOOD PRESSURE: 78 MMHG | SYSTOLIC BLOOD PRESSURE: 118 MMHG | BODY MASS INDEX: 26.44 KG/M2 | HEIGHT: 74 IN

## 2020-03-03 DIAGNOSIS — I51.9 LV DYSFUNCTION: ICD-10-CM

## 2020-03-03 DIAGNOSIS — I48.21 PERMANENT ATRIAL FIBRILLATION (HCC): ICD-10-CM

## 2020-03-03 DIAGNOSIS — I10 ESSENTIAL HYPERTENSION: ICD-10-CM

## 2020-03-03 DIAGNOSIS — E78.2 MIXED HYPERLIPIDEMIA: ICD-10-CM

## 2020-03-03 DIAGNOSIS — I25.10 CORONARY ARTERY DISEASE INVOLVING NATIVE CORONARY ARTERY OF NATIVE HEART WITHOUT ANGINA PECTORIS: Primary | ICD-10-CM

## 2020-03-03 PROCEDURE — 99214 OFFICE O/P EST MOD 30 MIN: CPT | Performed by: INTERNAL MEDICINE

## 2020-03-03 NOTE — PROGRESS NOTES
Subjective:     Encounter Date:03/03/2020      Patient ID: Wolf Blake is a 69 y.o. male.    Chief Complaint:  Chief Complaint   Patient presents with   • Coronary Artery Disease   • Atrial Fibrillation       HPI:  History of Present Illness  I had the pleasure of seeing Mr. Blake.  He is a 69-year-old gentleman with known coronary disease, mitral valve repair, diastolic dysfunction, left ventricular dysfunction with most recent ejection fraction of 35 to 40%.  He has permanent atrial fibrillation, underlying emphysema and lung nodules.     He recently was complaining of fatigue and dyspnea.  He also has noted lower extremity edema.  He underwent cardiac catheterization in December 2018.  He had severe native vessel disease.  The LIMA to the LAD was patent.  Saphenous vein graft to the marginal was patent.  Saphenous vein graft to RCA was patent.  His pulmonary wedge pressure was 18 mmHg and his PA pressure was 44 /22.  In October 2018 his ejection fraction was 45 to 50% with a dilated right ventricle.  In January 2020, his ejection fraction had decreased to 35 to 40%.     I did order a CT angiogram of the chest which was performed on 1/17/2020.  There was no evidence of aortic aneurysm, hematoma or dissection.  There was enlargement of the main pulmonary artery suggesting pulmonary arterial hypertension.  There was no evidence of pulmonary embolism.  Emphysema was noted.  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   He had a recent sleep study which was positive.  He is awaiting his mask.  He states he is going to go check on it today.  He is not complaining of chest discomfort or shortness of air.  He is not complaining of palpitations.  He is not aware that he is in atrial fibrillation.  His major complaint is that of fatigue.    The following portions of the patient's history were reviewed and updated as appropriate: allergies, current medications, past  family history, past medical history, past social history, past surgical history and problem list.    Problem List:  Patient Active Problem List   Diagnosis   • Hypertension   • Hyperlipidemia   • Coronary artery disease   • History of repair of mitral valve   • S/P CABG (coronary artery bypass graft)   • History of tricuspid valve repair   • History of atrial flutter   • LV dysfunction   • Permanent atrial fibrillation   • Other emphysema (CMS/HCC)   • Lung nodules   • Dyspnea on exertion       Past Medical History:  Past Medical History:   Diagnosis Date   • Atrial fibrillation (CMS/HCC)    • CHF (congestive heart failure) (CMS/[facility])    • Coronary artery disease     1-10-15 CABG: LIMA to LAD; saphenous vein graft to mid marginal; saphenous vein graft to RCA   • Coronary artery disease     December 2018: Left main normal. LAD with ostial 100% occlusion. Circumflex with 90% ostial stenosis. OM 2 totally occluded. RCA was dominant with a distal long 80% stenosis.   • Hyperlipidemia    • Hypertension    • Valvular disease     h/o of Mitral valve repair and Tricuspid valve repair        Past Surgical History:  Past Surgical History:   Procedure Laterality Date   • CARDIAC CATHETERIZATION      December 2018: Left main normal. LAD with ostial 100% occlusion. Circumflex with 90% ostial stenosis. OM 2 totally occluded. RCA was dominant with a distal long 80% stenosis.   • CORONARY ARTERY BYPASS GRAFT      LIMA to LAD; saphenous vein graft to mid marginal; saphenous vein graft to RCA   • MITRAL VALVE REPAIR/REPLACEMENT      January 2015: Mitral valve repair with #28 mm [employer] ring.    • TRICUSPID VALVE SURGERY      January 2015: Tricuspid valve repair using a remodeling annuloplasty #30 mm [employer] ring.       Social History:  Social History     Socioeconomic History   • Marital status:      Spouse name: Not on file   • Number of children: Not on file   • Years of education: Not on file   •  Highest education level: Not on file   Tobacco Use   • Smoking status: Former Smoker   Substance and Sexual Activity   • Alcohol use: Yes   • Drug use: No   • Sexual activity: Defer       Allergies:  No Known Allergies    Immunizations:    There is no immunization history on file for this patient.    ROS:  Review of Systems   Constitution: Positive for malaise/fatigue. Negative for chills, decreased appetite, fever, weight gain and weight loss.   HENT: Negative for congestion, hoarse voice, nosebleeds and sore throat.    Eyes: Negative for blurred vision, double vision and visual disturbance.   Cardiovascular: Negative for chest pain, claudication, irregular heartbeat, near-syncope, orthopnea, palpitations, paroxysmal nocturnal dyspnea and syncope.   Respiratory: Negative for cough, hemoptysis, shortness of breath, sleep disturbances due to breathing, snoring, sputum production and wheezing.    Endocrine: Negative for cold intolerance, heat intolerance, polydipsia and polyuria.   Hematologic/Lymphatic: Negative for adenopathy and bleeding problem. Does not bruise/bleed easily.   Skin: Negative for flushing, itching, nail changes and rash.   Musculoskeletal: Negative for arthritis, back pain, joint pain, muscle cramps, muscle weakness, myalgias and neck pain.   Gastrointestinal: Negative for bloating, abdominal pain, anorexia, change in bowel habit, constipation, diarrhea, heartburn, hematemesis, hematochezia, jaundice, melena, nausea and vomiting.   Genitourinary: Negative for dysuria, hematuria and nocturia.   Neurological: Negative for brief paralysis, disturbances in coordination, excessive daytime sleepiness, dizziness, headaches, light-headedness, loss of balance, numbness, paresthesias, seizures and vertigo.   Psychiatric/Behavioral: Negative for altered mental status and depression. The patient is not nervous/anxious.    Allergic/Immunologic: Negative for environmental allergies and hives.          Objective:  "        /78   Pulse 84   Ht 188 cm (74\")   Wt 93.4 kg (206 lb)   BMI 26.45 kg/m²     Physical Exam   Constitutional: He is oriented to person, place, and time. He appears well-developed and well-nourished. No distress.   HENT:   Head: Normocephalic and atraumatic.   Mouth/Throat: Oropharynx is clear and moist.   Eyes: Pupils are equal, round, and reactive to light. Conjunctivae and EOM are normal. No scleral icterus.   Neck: Normal range of motion. Neck supple. No thyromegaly present.   Cardiovascular: Normal rate, regular rhythm, S1 normal, S2 normal and intact distal pulses.  No extrasystoles are present. PMI is not displaced. Exam reveals no gallop, no S3, no S4, no friction rub and no decreased pulses.   Murmur ( There is a 1/6 systolic murmur heard at the left lower sternal border) heard.  Pulses:       Carotid pulses are 2+ on the right side, and 2+ on the left side.       Radial pulses are 2+ on the right side, and 2+ on the left side.        Dorsalis pedis pulses are 2+ on the right side, and 2+ on the left side.        Posterior tibial pulses are 2+ on the right side, and 2+ on the left side.   Pulmonary/Chest: Effort normal and breath sounds normal. No respiratory distress. He has no wheezes. He has no rales.   Abdominal: Soft. Bowel sounds are normal. He exhibits no distension and no mass. There is no tenderness. There is no rebound and no guarding.   Musculoskeletal: Normal range of motion.   Lymphadenopathy:     He has no cervical adenopathy.   Neurological: He is alert and oriented to person, place, and time. Coordination normal.   Skin: Skin is warm and dry. No rash noted. He is not diaphoretic. No pallor.   Psychiatric: He has a normal mood and affect. His behavior is normal.   Nursing note and vitals reviewed.      In-Office Procedure(s):  Procedures    ASCVD RIsk Score::  The ASCVD Risk score (Madhavilicha RUSSELL Jr., et al., 2013) failed to calculate for the following reasons:    Cannot find a " previous HDL lab    Cannot find a previous total cholesterol lab    Recent Radiology:  Imaging Results (Most Recent)     None          Lab Review:   not applicable             Assessment:          Diagnosis Plan   1. Coronary artery disease involving native coronary artery of native heart without angina pectoris     2. Essential hypertension     3. Mixed hyperlipidemia     4. LV dysfunction     5. Permanent atrial fibrillation            Plan:      Mr. Blake is doing fairly well.  He does indeed have sleep apnea and is awaiting his mask.  I think this will help his fatigue.  He continues to have atrial fibrillation.  A monitor in November 2019 demonstrated an average heart rate of 83 bpm.  He has had cardioversions in the past but always returned to atrial fibrillation.  I am going to wait and see how his treatment with sleep apnea goes.  And may eventually refer him to see Dr. Mace for atrial fibrillation ablation  I will see him back in 3 months.  We will most likely advance his carvedilol at that time      Level of Care:                 Kerry Sommers MD  03/03/20  .

## 2020-09-01 ENCOUNTER — TRANSCRIBE ORDERS (OUTPATIENT)
Dept: ADMINISTRATIVE | Facility: HOSPITAL | Age: 70
End: 2020-09-01

## 2020-09-01 DIAGNOSIS — I27.20 PULMONARY HYPERTENSION (HCC): Primary | ICD-10-CM

## 2020-09-09 ENCOUNTER — HOSPITAL ENCOUNTER (OUTPATIENT)
Dept: CARDIOLOGY | Facility: HOSPITAL | Age: 70
Discharge: HOME | End: 2020-09-09
Admitting: INTERNAL MEDICINE

## 2020-09-09 VITALS
HEIGHT: 74 IN | BODY MASS INDEX: 25.67 KG/M2 | WEIGHT: 200 LBS | DIASTOLIC BLOOD PRESSURE: 72 MMHG | SYSTOLIC BLOOD PRESSURE: 120 MMHG

## 2020-09-09 DIAGNOSIS — I27.20 PULMONARY HYPERTENSION (HCC): ICD-10-CM

## 2020-09-09 PROCEDURE — 93306 TTE W/DOPPLER COMPLETE: CPT

## 2020-09-10 LAB
BH CV ECHO MEAS - ACS: 1.9 CM
BH CV ECHO MEAS - AO MAX PG (FULL): 4.6 MMHG
BH CV ECHO MEAS - AO MAX PG: 9.6 MMHG
BH CV ECHO MEAS - AO MEAN PG (FULL): 1.7 MMHG
BH CV ECHO MEAS - AO MEAN PG: 4.7 MMHG
BH CV ECHO MEAS - AO ROOT AREA (BSA CORRECTED): 1.4
BH CV ECHO MEAS - AO ROOT AREA: 7.4 CM^2
BH CV ECHO MEAS - AO ROOT DIAM: 3.1 CM
BH CV ECHO MEAS - AO V2 MAX: 155 CM/SEC
BH CV ECHO MEAS - AO V2 MEAN: 102.7 CM/SEC
BH CV ECHO MEAS - AO V2 VTI: 31.6 CM
BH CV ECHO MEAS - ASC AORTA: 3.3 CM
BH CV ECHO MEAS - AVA(I,A): 2.3 CM^2
BH CV ECHO MEAS - AVA(I,D): 2.3 CM^2
BH CV ECHO MEAS - AVA(V,A): 2.4 CM^2
BH CV ECHO MEAS - AVA(V,D): 2.4 CM^2
BH CV ECHO MEAS - BSA(HAYCOCK): 2.2 M^2
BH CV ECHO MEAS - BSA: 2.2 M^2
BH CV ECHO MEAS - BZI_BMI: 25.7 KILOGRAMS/M^2
BH CV ECHO MEAS - BZI_METRIC_HEIGHT: 188 CM
BH CV ECHO MEAS - BZI_METRIC_WEIGHT: 90.7 KG
BH CV ECHO MEAS - EDV(CUBED): 144 ML
BH CV ECHO MEAS - EDV(MOD-SP2): 92.4 ML
BH CV ECHO MEAS - EDV(MOD-SP4): 69.3 ML
BH CV ECHO MEAS - EDV(TEICH): 131.9 ML
BH CV ECHO MEAS - EF(CUBED): 71.8 %
BH CV ECHO MEAS - EF(MOD-SP2): 75.8 %
BH CV ECHO MEAS - EF(MOD-SP4): 61.2 %
BH CV ECHO MEAS - EF(TEICH): 63.1 %
BH CV ECHO MEAS - ESV(CUBED): 40.6 ML
BH CV ECHO MEAS - ESV(MOD-SP2): 22.3 ML
BH CV ECHO MEAS - ESV(MOD-SP4): 26.9 ML
BH CV ECHO MEAS - ESV(TEICH): 48.7 ML
BH CV ECHO MEAS - FS: 34.4 %
BH CV ECHO MEAS - IVS/LVPW: 1.2
BH CV ECHO MEAS - IVSD: 0.68 CM
BH CV ECHO MEAS - LA DIMENSION: 4.8 CM
BH CV ECHO MEAS - LA/AO: 1.5
BH CV ECHO MEAS - LV DIASTOLIC VOL/BSA (35-75): 31.9 ML/M^2
BH CV ECHO MEAS - LV MASS(C)D: 110.6 GRAMS
BH CV ECHO MEAS - LV MASS(C)DI: 50.9 GRAMS/M^2
BH CV ECHO MEAS - LV MAX PG: 5.1 MMHG
BH CV ECHO MEAS - LV MEAN PG: 2.9 MMHG
BH CV ECHO MEAS - LV SYSTOLIC VOL/BSA (12-30): 12.4 ML/M^2
BH CV ECHO MEAS - LV V1 MAX: 112.4 CM/SEC
BH CV ECHO MEAS - LV V1 MEAN: 83 CM/SEC
BH CV ECHO MEAS - LV V1 VTI: 22.1 CM
BH CV ECHO MEAS - LVIDD: 5.2 CM
BH CV ECHO MEAS - LVIDS: 3.4 CM
BH CV ECHO MEAS - LVOT AREA: 3.2 CM^2
BH CV ECHO MEAS - LVOT DIAM: 2 CM
BH CV ECHO MEAS - LVPWD: 0.59 CM
BH CV ECHO MEAS - MV E MAX VEL: 235.9 CM/SEC
BH CV ECHO MEAS - MV MAX PG: 24.5 MMHG
BH CV ECHO MEAS - MV MEAN PG: 8.3 MMHG
BH CV ECHO MEAS - MV V2 MAX: 247.4 CM/SEC
BH CV ECHO MEAS - MV V2 MEAN: 122.9 CM/SEC
BH CV ECHO MEAS - MV V2 VTI: 65.5 CM
BH CV ECHO MEAS - MVA(VTI): 1.1 CM^2
BH CV ECHO MEAS - PA ACC TIME: 0.13 SEC
BH CV ECHO MEAS - PA PR(ACCEL): 18.3 MMHG
BH CV ECHO MEAS - PI END-D VEL: 201.9 CM/SEC
BH CV ECHO MEAS - PI MAX PG: 33 MMHG
BH CV ECHO MEAS - PI MAX VEL: 287.1 CM/SEC
BH CV ECHO MEAS - QP/QS: 1
BH CV ECHO MEAS - RAP SYSTOLE: 15 MMHG
BH CV ECHO MEAS - RV MAX PG: 2.1 MMHG
BH CV ECHO MEAS - RV MEAN PG: 1.1 MMHG
BH CV ECHO MEAS - RV V1 MAX: 73.1 CM/SEC
BH CV ECHO MEAS - RV V1 MEAN: 51.5 CM/SEC
BH CV ECHO MEAS - RV V1 VTI: 11.8 CM
BH CV ECHO MEAS - RVDD: 4 CM
BH CV ECHO MEAS - RVOT AREA: 6.1 CM^2
BH CV ECHO MEAS - RVOT DIAM: 2.8 CM
BH CV ECHO MEAS - RVSP: 65.2 MMHG
BH CV ECHO MEAS - SI(AO): 108.2 ML/M^2
BH CV ECHO MEAS - SI(CUBED): 47.6 ML/M^2
BH CV ECHO MEAS - SI(LVOT): 33 ML/M^2
BH CV ECHO MEAS - SI(MOD-SP2): 32.3 ML/M^2
BH CV ECHO MEAS - SI(MOD-SP4): 19.5 ML/M^2
BH CV ECHO MEAS - SI(TEICH): 38.3 ML/M^2
BH CV ECHO MEAS - SV(AO): 235.1 ML
BH CV ECHO MEAS - SV(CUBED): 103.4 ML
BH CV ECHO MEAS - SV(LVOT): 71.8 ML
BH CV ECHO MEAS - SV(MOD-SP2): 70.1 ML
BH CV ECHO MEAS - SV(MOD-SP4): 42.5 ML
BH CV ECHO MEAS - SV(RVOT): 72 ML
BH CV ECHO MEAS - SV(TEICH): 83.2 ML
BH CV ECHO MEAS - TR MAX VEL: 353.3 CM/SEC
MAXIMAL PREDICTED HEART RATE: 149 BPM
STRESS TARGET HR: 127 BPM

## 2020-09-10 PROCEDURE — 93306 TTE W/DOPPLER COMPLETE: CPT | Performed by: INTERNAL MEDICINE

## 2021-05-10 ENCOUNTER — OFFICE VISIT (OUTPATIENT)
Dept: CARDIOLOGY | Facility: CLINIC | Age: 71
End: 2021-05-10

## 2021-05-10 VITALS
HEART RATE: 83 BPM | WEIGHT: 193 LBS | BODY MASS INDEX: 24.77 KG/M2 | HEIGHT: 74 IN | SYSTOLIC BLOOD PRESSURE: 104 MMHG | DIASTOLIC BLOOD PRESSURE: 78 MMHG

## 2021-05-10 DIAGNOSIS — Z98.890 HISTORY OF TRICUSPID VALVE REPAIR: ICD-10-CM

## 2021-05-10 DIAGNOSIS — Z98.890 HISTORY OF REPAIR OF MITRAL VALVE: ICD-10-CM

## 2021-05-10 DIAGNOSIS — I10 ESSENTIAL HYPERTENSION: ICD-10-CM

## 2021-05-10 DIAGNOSIS — I25.10 CORONARY ARTERY DISEASE INVOLVING NATIVE CORONARY ARTERY OF NATIVE HEART WITHOUT ANGINA PECTORIS: ICD-10-CM

## 2021-05-10 DIAGNOSIS — E78.5 DYSLIPIDEMIA: Chronic | ICD-10-CM

## 2021-05-10 DIAGNOSIS — G47.33 OBSTRUCTIVE SLEEP APNEA: Chronic | ICD-10-CM

## 2021-05-10 DIAGNOSIS — I51.9 LV DYSFUNCTION: Chronic | ICD-10-CM

## 2021-05-10 DIAGNOSIS — I48.21 PERMANENT ATRIAL FIBRILLATION (HCC): Chronic | ICD-10-CM

## 2021-05-10 DIAGNOSIS — R06.09 DYSPNEA ON EXERTION: Primary | Chronic | ICD-10-CM

## 2021-05-10 PROCEDURE — 99214 OFFICE O/P EST MOD 30 MIN: CPT | Performed by: INTERNAL MEDICINE

## 2021-05-10 NOTE — PROGRESS NOTES
"Chief Complaint  Coronary Artery Disease and Atrial Fibrillation    Subjective    History of Present Illness      I had the pleasure of seeing Mr. Blake.  He is a 71-year-old gentleman with known coronary disease, mitral valve repair, diastolic dysfunction, and left ventricular dysfunction.  He had an echocardiogram in September 2020 which demonstrated an ejection fraction of 60%.  His right ventricle was dilated..  He has permanent atrial fibrillation, underlying emphysema and lung nodules.  He underwent cardiac catheterization in December 2018.  He has severe native disease.  The LIMA to the LAD was patent.  Saphenous vein graft to the marginal was patent.  Saphenous vein graft to RCA was patent.  His pulmonary capillary wedge pressure was 18 his PA pressure was 44/22.  He had a CT angiogram of the chest and abdomen in January 2020.  There was no evidence of aortic aneurysm hematoma or dissection.  There was enlargement of the main pulmonary artery suggesting pulmonary arterial hypertension.  No pulmonary embolism.  Emphysema was noted.  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    Mr. Blake states that he overall is feeling well.  He does complain of some shortness of air with exertion.  He denies orthopnea or paroxysmal nocturnal dyspnea.  No lower extremity edema.  He does have permanent atrial fibrillation but does not complain of palpitations, dizziness or near syncope.  He does continue with his normal daily activities without difficulty.  He has been compliant with his sleep apnea.  He has been evaluated by Dr. Bazan for his underlying pulmonary disease.    Objective   Vital Signs:   /78   Pulse 83   Ht 188 cm (74\")   Wt 87.5 kg (193 lb)   BMI 24.78 kg/m²     Vitals and nursing note reviewed.   Constitutional:       General: Not in acute distress.     Appearance: Healthy appearance. " Well-developed and not in distress. Not diaphoretic.   Eyes:      General: No scleral icterus.     Conjunctiva/sclera: Conjunctivae normal.      Pupils: Pupils are equal, round, and reactive to light.   HENT:      Head: Normocephalic and atraumatic.   Neck:      Thyroid: No thyromegaly.      Vascular: JVD normal.      Lymphadenopathy: No cervical adenopathy.   Pulmonary:      Effort: Pulmonary effort is normal. No respiratory distress.      Breath sounds: Normal breath sounds. No wheezing. No rales.   Cardiovascular:      PMI at left midclavicular line. Normal rate. Irregularly irregular rhythm. Normal S1. Normal S2.      Murmurs: There is no murmur.      No gallop. No S3 and S4 gallop. No click. No rub.   Pulses:     Intact distal pulses. No decreased pulses.   Edema:     Peripheral edema absent.   Abdominal:      General: Bowel sounds are normal. There is no distension.      Palpations: Abdomen is soft. There is no abdominal mass.      Tenderness: There is no abdominal tenderness. There is no guarding or rebound.   Musculoskeletal: Normal range of motion.      Cervical back: Normal range of motion and neck supple. Skin:     General: Skin is warm and dry.      Coloration: Skin is not pale.      Findings: No rash.   Neurological:      Mental Status: Alert, oriented to person, place, and time and oriented to person, place and time.      Coordination: Coordination normal.      Gait: Gait is intact.   Psychiatric:         Behavior: Behavior normal.         Result Review :   The following data was reviewed by: Kerry Sommers MD on 05/10/2021:                        Assessment and Plan    1. Coronary artery disease involving native coronary artery of native heart without angina pectoris  1-10-15 CABG: LIMA to LAD; saphenous vein graft to mid marginal; saphenous vein graft to RCA.    December 2018:  LM normal.  LAD with 100% occlusion.  Circumflex with 90% ostial stenosis.  OM 2 totally occluded.  RCA dominant with a  distal long 80% stenosis.  LIMA to LAD was patent.  SVG to mid marginal patent.  SVG to RCA patent.  EF 40 to 45%.  RV pressure 40/10.  PA pressure 44/22 wedge 18  No symptoms of angina.  He is on atorvastatin, carvedilol.  He has not been aspirin 81 mg daily.  I have asked him to initiate this      2. History of repair of mitral valve  January 2015: Mitral valve repair with # 28 mm [employer] ring  Echo in September 2020 demonstrated a mean gradient of 8.3 across the mitral valve with mild mitral insufficiency    3. History of tricuspid valve repair  2015: #30 mm [employer] ring  Echo September 2020 revealed mild to moderate tricuspid insufficiency    4. Essential hypertension  Controlled    5. LV dysfunction  He has had left ventricular dysfunction.  His most recent echo in September 2020, his ejection fraction was recorded as 60%.  I am going to repeat his echocardiogram today due to his dyspnea on exertion.    6. Permanent atrial fibrillation (CMS/[facility])  He is on apixaban.  His rate is controlled.  He has had cardioversion on numerous occasions in the past which were initially successful and then he would return to atrial fibrillation.  He is currently on rate control strategy    7. Dyslipidemia  He is on atorvastatin 40 mg daily.  He recently had lab work at his primary care physician's office.  We will obtain a copy    8. Obstructive sleep apnea  Uses CPAP    9. Dyspnea on exertion  He is experiencing some dyspnea on exertion.  I suspect this may be related to his pulmonary status but I will obtain an echocardiogram.    Overall Mr. Blake is doing fairly well from a cardiac standpoint.  He is not experiencing symptoms of angina.  He does have some mild dyspnea on exertion.  He is preparing to move to [place].  I am going to obtain an echocardiogram to make sure his ejection fraction is stable.  I do want to follow-up on his mitral valve since his gradients were increased on his last  echocardiogram.  He is also going to follow-up with his pulmonologist before departing.      Follow Up   No follow-ups on file.  Patient was given instructions and counseling regarding his condition or for health maintenance advice. Please see specific information pulled into the AVS if appropriate.

## 2021-05-11 ENCOUNTER — HOSPITAL ENCOUNTER (OUTPATIENT)
Dept: CARDIOLOGY | Facility: HOSPITAL | Age: 71
Discharge: HOME | End: 2021-05-11
Admitting: INTERNAL MEDICINE

## 2021-05-11 ENCOUNTER — APPOINTMENT (OUTPATIENT)
Dept: CARDIOLOGY | Facility: HOSPITAL | Age: 71
End: 2021-05-11

## 2021-05-11 VITALS
DIASTOLIC BLOOD PRESSURE: 72 MMHG | SYSTOLIC BLOOD PRESSURE: 117 MMHG | HEIGHT: 74 IN | BODY MASS INDEX: 24.77 KG/M2 | HEART RATE: 62 BPM | WEIGHT: 193 LBS

## 2021-05-11 DIAGNOSIS — R06.09 DYSPNEA ON EXERTION: ICD-10-CM

## 2021-05-11 LAB
AORTIC DIMENSIONLESS INDEX: 1.1 (DI)
ASCENDING AORTA: 2.5 CM
BH CV ECHO MEAS - ACS: 2 CM
BH CV ECHO MEAS - AO MAX PG (FULL): 0.67 MMHG
BH CV ECHO MEAS - AO MAX PG: 4.2 MMHG
BH CV ECHO MEAS - AO MEAN PG (FULL): 1 MMHG
BH CV ECHO MEAS - AO MEAN PG: 3 MMHG
BH CV ECHO MEAS - AO ROOT AREA (BSA CORRECTED): 1.7
BH CV ECHO MEAS - AO ROOT AREA: 10.2 CM^2
BH CV ECHO MEAS - AO ROOT DIAM: 3.6 CM
BH CV ECHO MEAS - AO V2 MAX: 103 CM/SEC
BH CV ECHO MEAS - AO V2 MEAN: 77 CM/SEC
BH CV ECHO MEAS - AO V2 VTI: 21.2 CM
BH CV ECHO MEAS - ASC AORTA: 2.5 CM
BH CV ECHO MEAS - AVA(I,A): 3.4 CM^2
BH CV ECHO MEAS - AVA(I,D): 3.4 CM^2
BH CV ECHO MEAS - AVA(V,A): 2.9 CM^2
BH CV ECHO MEAS - AVA(V,D): 2.9 CM^2
BH CV ECHO MEAS - BSA(HAYCOCK): 2.1 M^2
BH CV ECHO MEAS - BSA: 2.1 M^2
BH CV ECHO MEAS - BZI_BMI: 24.8 KILOGRAMS/M^2
BH CV ECHO MEAS - BZI_METRIC_HEIGHT: 188 CM
BH CV ECHO MEAS - BZI_METRIC_WEIGHT: 87.5 KG
BH CV ECHO MEAS - EDV(CUBED): 97.3 ML
BH CV ECHO MEAS - EDV(MOD-SP2): 97 ML
BH CV ECHO MEAS - EDV(MOD-SP4): 104 ML
BH CV ECHO MEAS - EDV(TEICH): 97.3 ML
BH CV ECHO MEAS - EF(CUBED): 63.1 %
BH CV ECHO MEAS - EF(MOD-BP): 50.9 %
BH CV ECHO MEAS - EF(MOD-SP2): 55.7 %
BH CV ECHO MEAS - EF(MOD-SP4): 50 %
BH CV ECHO MEAS - EF(TEICH): 54.7 %
BH CV ECHO MEAS - ESV(CUBED): 35.9 ML
BH CV ECHO MEAS - ESV(MOD-SP2): 43 ML
BH CV ECHO MEAS - ESV(MOD-SP4): 52 ML
BH CV ECHO MEAS - ESV(TEICH): 44.1 ML
BH CV ECHO MEAS - FS: 28.3 %
BH CV ECHO MEAS - IVS/LVPW: 1.1
BH CV ECHO MEAS - IVSD: 1.1 CM
BH CV ECHO MEAS - LAT PEAK E' VEL: 7.2 CM/SEC
BH CV ECHO MEAS - LV DIASTOLIC VOL/BSA (35-75): 48.6 ML/M^2
BH CV ECHO MEAS - LV MASS(C)D: 169.9 GRAMS
BH CV ECHO MEAS - LV MASS(C)DI: 79.4 GRAMS/M^2
BH CV ECHO MEAS - LV MAX PG: 3.6 MMHG
BH CV ECHO MEAS - LV MEAN PG: 2 MMHG
BH CV ECHO MEAS - LV SYSTOLIC VOL/BSA (12-30): 24.3 ML/M^2
BH CV ECHO MEAS - LV V1 MAX: 94.5 CM/SEC
BH CV ECHO MEAS - LV V1 MEAN: 68.7 CM/SEC
BH CV ECHO MEAS - LV V1 VTI: 22.7 CM
BH CV ECHO MEAS - LVIDD: 4.6 CM
BH CV ECHO MEAS - LVIDS: 3.3 CM
BH CV ECHO MEAS - LVLD AP2: 8 CM
BH CV ECHO MEAS - LVLD AP4: 7.7 CM
BH CV ECHO MEAS - LVLS AP2: 7.5 CM
BH CV ECHO MEAS - LVLS AP4: 6.6 CM
BH CV ECHO MEAS - LVOT AREA (M): 3.1 CM^2
BH CV ECHO MEAS - LVOT AREA: 3.1 CM^2
BH CV ECHO MEAS - LVOT DIAM: 2 CM
BH CV ECHO MEAS - LVPWD: 1 CM
BH CV ECHO MEAS - MED PEAK E' VEL: 5.2 CM/SEC
BH CV ECHO MEAS - MV DEC SLOPE: 610 CM/SEC^2
BH CV ECHO MEAS - MV DEC TIME: 0.22 SEC
BH CV ECHO MEAS - MV E MAX VEL: 214 CM/SEC
BH CV ECHO MEAS - MV MAX PG: 15.1 MMHG
BH CV ECHO MEAS - MV MEAN PG: 5 MMHG
BH CV ECHO MEAS - MV P1/2T MAX VEL: 190 CM/SEC
BH CV ECHO MEAS - MV P1/2T: 91.2 MSEC
BH CV ECHO MEAS - MV V2 MAX: 194 CM/SEC
BH CV ECHO MEAS - MV V2 MEAN: 96.9 CM/SEC
BH CV ECHO MEAS - MV V2 VTI: 51.6 CM
BH CV ECHO MEAS - MVA P1/2T LCG: 1.2 CM^2
BH CV ECHO MEAS - MVA(P1/2T): 2.4 CM^2
BH CV ECHO MEAS - MVA(VTI): 1.4 CM^2
BH CV ECHO MEAS - PA ACC TIME: 0.1 SEC
BH CV ECHO MEAS - PA MAX PG (FULL): 1.9 MMHG
BH CV ECHO MEAS - PA MAX PG: 3.3 MMHG
BH CV ECHO MEAS - PA PR(ACCEL): 32.7 MMHG
BH CV ECHO MEAS - PA V2 MAX: 90.9 CM/SEC
BH CV ECHO MEAS - PI END-D VEL: 185 CM/SEC
BH CV ECHO MEAS - PVA(V,A): 2.7 CM^2
BH CV ECHO MEAS - PVA(V,D): 2.7 CM^2
BH CV ECHO MEAS - QP/QS: 0.69
BH CV ECHO MEAS - RAP SYSTOLE: 3 MMHG
BH CV ECHO MEAS - RV MAX PG: 1.4 MMHG
BH CV ECHO MEAS - RV MEAN PG: 1 MMHG
BH CV ECHO MEAS - RV V1 MAX: 60.1 CM/SEC
BH CV ECHO MEAS - RV V1 MEAN: 36.3 CM/SEC
BH CV ECHO MEAS - RV V1 VTI: 11.9 CM
BH CV ECHO MEAS - RVOT AREA: 4.2 CM^2
BH CV ECHO MEAS - RVOT DIAM: 2.3 CM
BH CV ECHO MEAS - RVSP: 30.2 MMHG
BH CV ECHO MEAS - SI(AO): 100.8 ML/M^2
BH CV ECHO MEAS - SI(CUBED): 28.7 ML/M^2
BH CV ECHO MEAS - SI(LVOT): 33.3 ML/M^2
BH CV ECHO MEAS - SI(MOD-SP2): 25.2 ML/M^2
BH CV ECHO MEAS - SI(MOD-SP4): 24.3 ML/M^2
BH CV ECHO MEAS - SI(TEICH): 24.9 ML/M^2
BH CV ECHO MEAS - SV(AO): 215.8 ML
BH CV ECHO MEAS - SV(CUBED): 61.4 ML
BH CV ECHO MEAS - SV(LVOT): 71.3 ML
BH CV ECHO MEAS - SV(MOD-SP2): 54 ML
BH CV ECHO MEAS - SV(MOD-SP4): 52 ML
BH CV ECHO MEAS - SV(RVOT): 49.4 ML
BH CV ECHO MEAS - SV(TEICH): 53.2 ML
BH CV ECHO MEAS - TAPSE (>1.6): 1.1 CM
BH CV ECHO MEAS - TR MAX VEL: 261 CM/SEC
BH CV ECHO MEASUREMENTS AVERAGE E/E' RATIO: 34.52
BH CV XLRA - RV BASE: 3.9 CM
BH CV XLRA - RV LENGTH: 7.2 CM
BH CV XLRA - RV MID: 3.5 CM
BH CV XLRA - TDI S': 10.2 CM/SEC
LEFT ATRIUM VOLUME INDEX: 43 ML/M2
MAXIMAL PREDICTED HEART RATE: 148 BPM
SINUS: 3.1 CM
STJ: 2.5 CM
STRESS TARGET HR: 126 BPM

## 2021-05-11 PROCEDURE — 93306 TTE W/DOPPLER COMPLETE: CPT | Performed by: INTERNAL MEDICINE

## 2021-05-11 PROCEDURE — 93306 TTE W/DOPPLER COMPLETE: CPT

## 2021-05-13 ENCOUNTER — TELEPHONE (OUTPATIENT)
Dept: CARDIOLOGY | Facility: CLINIC | Age: 71
End: 2021-05-13